# Patient Record
Sex: FEMALE | Race: WHITE | NOT HISPANIC OR LATINO | Employment: FULL TIME | URBAN - METROPOLITAN AREA
[De-identification: names, ages, dates, MRNs, and addresses within clinical notes are randomized per-mention and may not be internally consistent; named-entity substitution may affect disease eponyms.]

---

## 2017-04-11 ENCOUNTER — OFFICE VISIT (OUTPATIENT)
Dept: URGENT CARE | Facility: CLINIC | Age: 42
End: 2017-04-11
Payer: COMMERCIAL

## 2017-04-11 VITALS
HEIGHT: 64 IN | DIASTOLIC BLOOD PRESSURE: 66 MMHG | RESPIRATION RATE: 16 BRPM | TEMPERATURE: 98.1 F | SYSTOLIC BLOOD PRESSURE: 100 MMHG | HEART RATE: 81 BPM | OXYGEN SATURATION: 97 % | BODY MASS INDEX: 30.66 KG/M2 | WEIGHT: 179.6 LBS

## 2017-04-11 DIAGNOSIS — K14.8 TONGUE LESION: ICD-10-CM

## 2017-04-11 DIAGNOSIS — J06.9 VIRAL URI WITH COUGH: ICD-10-CM

## 2017-04-11 DIAGNOSIS — R49.0 HOARSE VOICE QUALITY: ICD-10-CM

## 2017-04-11 PROCEDURE — 99214 OFFICE O/P EST MOD 30 MIN: CPT | Performed by: NURSE PRACTITIONER

## 2017-04-11 RX ORDER — BENZONATATE 100 MG/1
100 CAPSULE ORAL 3 TIMES DAILY PRN
Qty: 15 CAP | Refills: 0 | Status: SHIPPED | OUTPATIENT
Start: 2017-04-11 | End: 2017-04-16

## 2017-04-11 ASSESSMENT — ENCOUNTER SYMPTOMS
SHORTNESS OF BREATH: 0
CHILLS: 0
SORE THROAT: 1
HEADACHES: 0
NECK PAIN: 0
WHEEZING: 0
PALPITATIONS: 0
FEVER: 0
MYALGIAS: 0
ORTHOPNEA: 0
DIAPHORESIS: 0
WEAKNESS: 0
HEMOPTYSIS: 0
SPUTUM PRODUCTION: 0
COUGH: 1

## 2017-04-11 NOTE — PROGRESS NOTES
"Subjective:      Jennifer Barton is a 41 y.o. female who presents with Laryngitis            HPI  Patient comes in today with 5-6 day history of a dry cough.  She notes that after she has coughing fits, she has a hoarse voice and her throat is mildly sore.  She has not taken any medication to treat the symptoms.  As a possible associated symptom, she also noted a new onset painful lesion on the right side of her tongue in the past 3 days.  She denies any known trauma to the tongue or excess salty or spicy foods.  She denies any prior history of similar symptoms.  No history of smoking or chewing tobacco.      Review of Systems   Constitutional: Positive for malaise/fatigue. Negative for fever, chills and diaphoresis.   HENT: Positive for sore throat. Negative for congestion and ear pain.    Respiratory: Positive for cough. Negative for hemoptysis, sputum production, shortness of breath and wheezing.    Cardiovascular: Negative for chest pain, palpitations, orthopnea and leg swelling.   Musculoskeletal: Negative for myalgias and neck pain.   Skin: Negative for rash.   Neurological: Negative for weakness and headaches.     Medications, Allergies, and current problem list reviewed today in Epic     Objective:     /66 mmHg  Pulse 81  Temp(Src) 36.7 °C (98.1 °F)  Resp 16  Ht 1.626 m (5' 4\")  Wt 81.466 kg (179 lb 9.6 oz)  BMI 30.81 kg/m2  SpO2 97%     Physical Exam   Constitutional: She is oriented to person, place, and time. She appears well-developed and well-nourished. No distress.   HENT:   Head: Normocephalic.   Right Ear: External ear normal.   Left Ear: External ear normal.   Nose: Nose normal.   Mouth/Throat: No oropharyngeal exudate.   Minimal oropharyngeal erythema with no exudate or edema.  Voice has a hoarse quality.  There is a white, plaque-like lesion on the right lateral side of the tongue near the back molar region. It has an irregular linear shape measuring roughly 3 mm wide and 6 mm long with " irregular, jagged borders. It is tender to palpation.  No erythema or swelling.  NO other oral lesions noted.   Eyes: Conjunctivae are normal. Pupils are equal, round, and reactive to light. Right eye exhibits no discharge. Left eye exhibits no discharge. No scleral icterus.   Neck: Neck supple. No JVD present. No tracheal deviation present. No thyromegaly present.   Cardiovascular: Normal rate, regular rhythm and normal heart sounds.  Exam reveals no gallop and no friction rub.    No murmur heard.  Pulmonary/Chest: Effort normal and breath sounds normal. No stridor. No respiratory distress. She has no wheezes. She has no rales. She exhibits no tenderness.   Dry cough in clinic.   Lymphadenopathy:     She has no cervical adenopathy.   Neurological: She is alert and oriented to person, place, and time.   Skin: Skin is warm and dry. No rash noted. She is not diaphoretic. No erythema.   Vitals reviewed.              Assessment/Plan:     1. Viral URI with cough  - benzonatate (TESSALON) 100 MG Cap; Take 1 Cap by mouth 3 times a day as needed for Cough for up to 5 days.  Dispense: 15 Cap; Refill: 0    2. Tongue lesion    3. Hoarse voice quality    Advised patient that based on the history and exam findings, this is likely a self-limiting viral illness.  There is no indication for antibiotics at this time.  Tessalon as prescribed prn cough.  OTC NSAIDs or tylenol prn fever, pain.  OTC throat sprays or lozenges prn symptom management.  Maintain adequate po hydration.  RTC in 5-7 days if symptoms persist, sooner if worse.  Monitor tongue lesion.  Follow up with PCP or dentist if lesion persists beyond 4 weeks, or sooner if increasing in pain, size, or other lesions develop.  Patient verbalized understanding of and agreed with plan of care.

## 2017-05-22 ENCOUNTER — OFFICE VISIT (OUTPATIENT)
Dept: URGENT CARE | Facility: PHYSICIAN GROUP | Age: 42
End: 2017-05-22
Payer: COMMERCIAL

## 2017-05-22 VITALS
RESPIRATION RATE: 16 BRPM | BODY MASS INDEX: 30.73 KG/M2 | OXYGEN SATURATION: 99 % | SYSTOLIC BLOOD PRESSURE: 106 MMHG | HEIGHT: 64 IN | HEART RATE: 72 BPM | DIASTOLIC BLOOD PRESSURE: 60 MMHG | WEIGHT: 180 LBS | TEMPERATURE: 99.1 F

## 2017-05-22 DIAGNOSIS — N39.0 ACUTE LOWER UTI: ICD-10-CM

## 2017-05-22 LAB
APPEARANCE UR: NORMAL
BILIRUB UR STRIP-MCNC: NEGATIVE MG/DL
COLOR UR AUTO: NORMAL
GLUCOSE UR STRIP.AUTO-MCNC: NEGATIVE MG/DL
INT CON NEG: NEGATIVE
INT CON POS: POSITIVE
KETONES UR STRIP.AUTO-MCNC: NEGATIVE MG/DL
LEUKOCYTE ESTERASE UR QL STRIP.AUTO: NORMAL
NITRITE UR QL STRIP.AUTO: POSITIVE
PH UR STRIP.AUTO: 7 [PH] (ref 5–8)
POC URINE PREGNANCY TEST: NEGATIVE
PROT UR QL STRIP: 100 MG/DL
RBC UR QL AUTO: NORMAL
SP GR UR STRIP.AUTO: 1.02
UROBILINOGEN UR STRIP-MCNC: NEGATIVE MG/DL

## 2017-05-22 PROCEDURE — 81025 URINE PREGNANCY TEST: CPT | Performed by: FAMILY MEDICINE

## 2017-05-22 PROCEDURE — 81002 URINALYSIS NONAUTO W/O SCOPE: CPT | Performed by: FAMILY MEDICINE

## 2017-05-22 PROCEDURE — 99214 OFFICE O/P EST MOD 30 MIN: CPT | Performed by: FAMILY MEDICINE

## 2017-05-22 RX ORDER — NITROFURANTOIN 25; 75 MG/1; MG/1
100 CAPSULE ORAL EVERY 12 HOURS
Qty: 10 CAP | Refills: 0 | Status: SHIPPED | OUTPATIENT
Start: 2017-05-22 | End: 2017-05-27

## 2017-05-22 ASSESSMENT — ENCOUNTER SYMPTOMS
FEVER: 0
CHILLS: 0
ABDOMINAL PAIN: 1
NAUSEA: 0
VOMITING: 0

## 2017-05-22 NOTE — MR AVS SNAPSHOT
"        Jennifer Barton   2017 2:15 PM   Office Visit   MRN: [id number]    Department:  [facility]   Dept Phone:  [phone]    Description:  Female : 1975   Provider:  Abundio Mercado M.D.           Reason for Visit     Urinary Frequency Painful urination x 4 days       Allergies as of 2017     No Known Allergies      You were diagnosed with     Acute lower UTI   [[id number]]         Vital Signs     Blood Pressure Pulse Temperature Respirations Height Weight    106/60 mmHg 72 37.3 °C (99.1 °F) 16 1.626 m (5' 4.02\") 81.647 kg (180 lb)    Body Mass Index Oxygen Saturation Smoking Status             30.88 kg/m2 99% Never Smoker          Basic Information     Date Of Birth Sex Race Ethnicity Preferred Language    1975 Female White Non- English      Health Maintenance        Date Due Completion Dates    PAP SMEAR 1998 ---    IMM DTaP/Tdap/Td Vaccine (2 - Td) 1/15/2025 1/15/2015            Results     POCT Urinalysis      Component Value Standard Range & Units    POC Color Straw Negative    POC Appearance Cloudy Negative    POC Leukocyte Esterase Large Negative    POC Nitrites Positive Negative    POC Urobiligen Negative Negative (0.2) mg/dL    POC Protein 100 Negative mg/dL    POC Urine PH 7.0 5.0 - 8.0    POC Blood Mod/Hemo Negative    POC Specific Gravity 1.020 <1.005 - >1.030    POC Ketones Negative Negative mg/dL    POC Biliruben Negative Negative mg/dL    POC Glucose Negative Negative mg/dL                POC Urine Pregnancy      Component Value Standard Range & Units    POC Urine Pregnancy Test Negative Negative    Internal Control Positive Positive     Internal Control Negative Negative                         Current Immunizations     Influenza Vaccine Quad Inj (Pf) 1/15/2015  4:08 AM    Tdap Vaccine 1/15/2015  4:10 AM      Below and/or attached are the medications your provider expects you to take. Review all of your home medications and newly ordered medications with your " provider and/or pharmacist. Follow medication instructions as directed by your provider and/or pharmacist. Please keep your medication list with you and share with your provider. Update the information when medications are discontinued, doses are changed, or new medications (including over-the-counter products) are added; and carry medication information at all times in the event of emergency situations     Allergies:  No Known Allergies          Medications  Valid as of: May 22, 2017 -  2:48 PM    Generic Name Brand Name Tablet Size Instructions for use    Amoxicillin-Pot Clavulanate (Tab) AUGMENTIN 875-125 MG Take 1 Tab by mouth 2 times a day.        Aspirin Effervescent   Take  by mouth.        Docusate Sodium (Cap) COLACE 100 MG Take 100 mg by mouth 2 times a day as needed.        Ibuprofen (Tab) MOTRIN 600 MG Take 1 Tab by mouth every 6 hours as needed (Cramping).        Ibuprofen (Tab) MOTRIN 600 MG Take 1 Tab by mouth every 6 hours as needed.        Nitrofurantoin Monohyd Macro (Cap) MACROBID 100 MG Take 1 Cap by mouth every 12 hours for 5 days.        Oxycodone-Acetaminophen (Tab) PERCOCET 5-325 MG Take 1-2 Tabs by mouth every four hours as needed (pain).        Prenatal Vit-Fe Fumarate-FA   Take 1 Tab by mouth.        .                 Medicines prescribed today were sent to:     [facility][id number] - [place] [address]    Phone: [phone] Fax: [phone]    Open 24 Hours?: No      Medication refill instructions:       If your prescription bottle indicates you have medication refills left, it is not necessary to call your provider’s office. Please contact your pharmacy and they will refill your medication.    If your prescription bottle indicates you do not have any refills left, you may request refills at any time through one of the following ways: The online [employer] system (except Urgent Care), by calling your provider’s office, or by asking your pharmacy to  contact your provider’s office with a refill request. Medication refills are processed only during regular business hours and may not be available until the next business day. Your provider may request additional information or to have a follow-up visit with you prior to refilling your medication.   *Please Note: Medication refills are assigned a new Rx number when refilled electronically. Your pharmacy may indicate that no refills were authorized even though a new prescription for the same medication is available at the pharmacy. Please request the medicine by name with the pharmacy before contacting your provider for a refill.        Instructions    Urinary Tract Infection  Urinary tract infections (UTIs) can develop anywhere along your urinary tract. Your urinary tract is your body's drainage system for removing wastes and extra water. Your urinary tract includes two kidneys, two ureters, a bladder, and a urethra. Your kidneys are a pair of bean-shaped organs. Each kidney is about the size of your fist. They are located below your ribs, one on each side of your spine.  CAUSES  Infections are caused by microbes, which are microscopic organisms, including fungi, viruses, and bacteria. These organisms are so small that they can only be seen through a microscope. Bacteria are the microbes that most commonly cause UTIs.  SYMPTOMS   Symptoms of UTIs may vary by age and gender of the patient and by the location of the infection. Symptoms in young women typically include a frequent and intense urge to urinate and a painful, burning feeling in the bladder or urethra during urination. Older women and men are more likely to be tired, shaky, and weak and have muscle aches and abdominal pain. A fever may mean the infection is in your kidneys. Other symptoms of a kidney infection include pain in your back or sides below the ribs, nausea, and vomiting.  DIAGNOSIS  To diagnose a UTI, your caregiver will ask you about your  symptoms. Your caregiver also will ask to provide a urine sample. The urine sample will be tested for bacteria and white blood cells. White blood cells are made by your body to help fight infection.  TREATMENT   Typically, UTIs can be treated with medication. Because most UTIs are caused by a bacterial infection, they usually can be treated with the use of antibiotics. The choice of antibiotic and length of treatment depend on your symptoms and the type of bacteria causing your infection.  HOME CARE INSTRUCTIONS  · If you were prescribed antibiotics, take them exactly as your caregiver instructs you. Finish the medication even if you feel better after you have only taken some of the medication.  · Drink enough water and fluids to keep your urine clear or pale yellow.  · Avoid caffeine, tea, and carbonated beverages. They tend to irritate your bladder.  · Empty your bladder often. Avoid holding urine for long periods of time.  · Empty your bladder before and after sexual intercourse.  · After a bowel movement, women should cleanse from front to back. Use each tissue only once.  SEEK MEDICAL CARE IF:   · You have back pain.  · You develop a fever.  · Your symptoms do not begin to resolve within 3 days.  SEEK IMMEDIATE MEDICAL CARE IF:   · You have severe back pain or lower abdominal pain.  · You develop chills.  · You have nausea or vomiting.  · You have continued burning or discomfort with urination.  MAKE SURE YOU:   · Understand these instructions.  · Will watch your condition.  · Will get help right away if you are not doing well or get worse.     This information is not intended to replace advice given to you by your health care provider. Make sure you discuss any questions you have with your health care provider.     Document Released: 09/27/2006 Document Revised: 01/08/2016 Document Reviewed: 01/25/2013  [employer] Interactive Patient Education ©2016 [employer]    Urinary Tract Infection  A urinary tract  infection (UTI) can occur any place along the urinary tract. The tract includes the kidneys, ureters, bladder, and urethra. A type of germ called bacteria often causes a UTI. UTIs are often helped with antibiotic medicine.   HOME CARE   · If given, take antibiotics as told by your doctor. Finish them even if you start to feel better.  · Drink enough fluids to keep your pee (urine) clear or pale yellow.  · Avoid tea, drinks with caffeine, and bubbly (carbonated) drinks.  · Pee often. Avoid holding your pee in for a long time.  · Pee before and after having sex (intercourse).  · Wipe from front to back after you poop (bowel movement) if you are a woman. Use each tissue only once.  GET HELP RIGHT AWAY IF:   · You have back pain.  · You have lower belly (abdominal) pain.  · You have chills.  · You feel sick to your stomach (nauseous).  · You throw up (vomit).  · Your burning or discomfort with peeing does not go away.  · You have a fever.  · Your symptoms are not better in 3 days.  MAKE SURE YOU:   · Understand these instructions.  · Will watch your condition.  · Will get help right away if you are not doing well or get worse.     This information is not intended to replace advice given to you by your health care provider. Make sure you discuss any questions you have with your health care provider.     Document Released: 06/05/2009 Document Revised: 01/08/2016 Document Reviewed: 07/18/2013  [email or web address] Interactive Patient Education ©2016 [employer] Inc.            Wellsphere Access Code: [email or web address]  Expires: 5/25/2017  1:05 PM    Wellsphere  A secure, online tool to manage your health information     [employer] Wellsphere® is a secure, online tool that connects you to your personalized health information from the privacy of your home -- day or night - making it very easy for you to manage your healthcare. Once the activation process is completed, you can even access your medical information using the Wellsphere rusty, which is  available for free in the Apple [employer] store or Google Play store.     [employer] Enterprise provides the following levels of access (as shown below):   My Chart Features   [facility] Doctor [facility]  Specialists [facility]  Urgent  Care Non-[facility]  Primary Care  Doctor   Email your healthcare team securely and privately 24/7 X X X    Manage appointments: schedule your next appointment; view details of past/upcoming appointments X      Request prescription refills. X      View recent personal medical records, including lab and immunizations X X X X   View health record, including health history, allergies, medications X X X X   Read reports about your outpatient visits, procedures, consult and ER notes X X X X   See your discharge summary, which is a recap of your hospital and/or ER visit that includes your diagnosis, lab results, and care plan. X X       How to register for [employer] Enterprise:  1. Go to  [email or web address].  2. Click on the Sign Up Now box, which takes you to the New Member Sign Up page. You will need to provide the following information:  a. Enter your Process System Enterprise Access Code exactly as it appears at the top of this page. (You will not need to use this code after you’ve completed the sign-up process. If you do not sign up before the expiration date, you must request a new code.)   b. Enter your date of birth.   c. Enter your home email address.   d. Click Submit, and follow the next screen’s instructions.  3. Create a Process System Enterprise ID. This will be your Process System Enterprise login ID and cannot be changed, so think of one that is secure and easy to remember.  4. Create a [employer] Enterprise password. You can change your password at any time.  5. Enter your Password Reset Question and Answer. This can be used at a later time if you forget your password.   6. Enter your e-mail address. This allows you to receive e-mail notifications when new information is available in Process System Enterprise.  7. Click Sign Up. You can now view your health information.    For  assistance activating your [employer] research[employer] account, call [phone]

## 2017-05-22 NOTE — PROGRESS NOTES
"Subjective:      Jennifer Barton is a 41 y.o. female who presents with Urinary Frequency            Urinary Frequency  This is a new problem. The current episode started in the past 7 days. The problem occurs constantly. The problem has been gradually worsening. Associated symptoms include abdominal pain and urinary symptoms. Pertinent negatives include no chills, fever, nausea or vomiting.       Review of Systems   Constitutional: Negative for fever and chills.   Gastrointestinal: Positive for abdominal pain. Negative for nausea and vomiting.     No Known Allergies      Objective:     /60 mmHg  Pulse 72  Temp(Src) 37.3 °C (99.1 °F)  Resp 16  Ht 1.626 m (5' 4.02\")  Wt 81.647 kg (180 lb)  BMI 30.88 kg/m2  SpO2 99%     Physical Exam   Constitutional: She is oriented to person, place, and time. She appears well-developed and well-nourished. No distress.   HENT:   Head: Normocephalic and atraumatic.   Eyes: Conjunctivae and EOM are normal. Pupils are equal, round, and reactive to light.   Cardiovascular: Normal rate, regular rhythm, normal heart sounds and intact distal pulses.    No murmur heard.  Pulmonary/Chest: Effort normal and breath sounds normal. No respiratory distress.   Abdominal: Soft. Bowel sounds are normal. She exhibits no distension. There is tenderness in the suprapubic area. There is no CVA tenderness.   Musculoskeletal: Normal range of motion.   Neurological: She is alert and oriented to person, place, and time. She has normal reflexes. No sensory deficit.   Skin: Skin is warm and dry.   Psychiatric: She has a normal mood and affect. Her behavior is normal.   Vitals reviewed.              Assessment/Plan:     1. Acute lower UTI  Differential diagnosis, natural history, supportive care, and indications for immediate follow-up discussed.   - POCT Urinalysis  - nitrofurantoin monohydr macro (MACROBID) 100 MG Cap; Take 1 Cap by mouth every 12 hours for 5 days.  Dispense: 10 Cap; Refill: 0  - POC " Urine Pregnancy

## 2017-05-22 NOTE — PATIENT INSTRUCTIONS
Urinary Tract Infection  Urinary tract infections (UTIs) can develop anywhere along your urinary tract. Your urinary tract is your body's drainage system for removing wastes and extra water. Your urinary tract includes two kidneys, two ureters, a bladder, and a urethra. Your kidneys are a pair of bean-shaped organs. Each kidney is about the size of your fist. They are located below your ribs, one on each side of your spine.  CAUSES  Infections are caused by microbes, which are microscopic organisms, including fungi, viruses, and bacteria. These organisms are so small that they can only be seen through a microscope. Bacteria are the microbes that most commonly cause UTIs.  SYMPTOMS   Symptoms of UTIs may vary by age and gender of the patient and by the location of the infection. Symptoms in young women typically include a frequent and intense urge to urinate and a painful, burning feeling in the bladder or urethra during urination. Older women and men are more likely to be tired, shaky, and weak and have muscle aches and abdominal pain. A fever may mean the infection is in your kidneys. Other symptoms of a kidney infection include pain in your back or sides below the ribs, nausea, and vomiting.  DIAGNOSIS  To diagnose a UTI, your caregiver will ask you about your symptoms. Your caregiver also will ask to provide a urine sample. The urine sample will be tested for bacteria and white blood cells. White blood cells are made by your body to help fight infection.  TREATMENT   Typically, UTIs can be treated with medication. Because most UTIs are caused by a bacterial infection, they usually can be treated with the use of antibiotics. The choice of antibiotic and length of treatment depend on your symptoms and the type of bacteria causing your infection.  HOME CARE INSTRUCTIONS  · If you were prescribed antibiotics, take them exactly as your caregiver instructs you. Finish the medication even if you feel better after you  have only taken some of the medication.  · Drink enough water and fluids to keep your urine clear or pale yellow.  · Avoid caffeine, tea, and carbonated beverages. They tend to irritate your bladder.  · Empty your bladder often. Avoid holding urine for long periods of time.  · Empty your bladder before and after sexual intercourse.  · After a bowel movement, women should cleanse from front to back. Use each tissue only once.  SEEK MEDICAL CARE IF:   · You have back pain.  · You develop a fever.  · Your symptoms do not begin to resolve within 3 days.  SEEK IMMEDIATE MEDICAL CARE IF:   · You have severe back pain or lower abdominal pain.  · You develop chills.  · You have nausea or vomiting.  · You have continued burning or discomfort with urination.  MAKE SURE YOU:   · Understand these instructions.  · Will watch your condition.  · Will get help right away if you are not doing well or get worse.     This information is not intended to replace advice given to you by your health care provider. Make sure you discuss any questions you have with your health care provider.     Document Released: 09/27/2006 Document Revised: 01/08/2016 Document Reviewed: 01/25/2013  [employer] Interactive Patient Education ©2016 Elsevier Inc.    Urinary Tract Infection  A urinary tract infection (UTI) can occur any place along the urinary tract. The tract includes the kidneys, ureters, bladder, and urethra. A type of germ called bacteria often causes a UTI. UTIs are often helped with antibiotic medicine.   HOME CARE   · If given, take antibiotics as told by your doctor. Finish them even if you start to feel better.  · Drink enough fluids to keep your pee (urine) clear or pale yellow.  · Avoid tea, drinks with caffeine, and bubbly (carbonated) drinks.  · Pee often. Avoid holding your pee in for a long time.  · Pee before and after having sex (intercourse).  · Wipe from front to back after you poop (bowel movement) if you are a woman. Use each  tissue only once.  GET HELP RIGHT AWAY IF:   · You have back pain.  · You have lower belly (abdominal) pain.  · You have chills.  · You feel sick to your stomach (nauseous).  · You throw up (vomit).  · Your burning or discomfort with peeing does not go away.  · You have a fever.  · Your symptoms are not better in 3 days.  MAKE SURE YOU:   · Understand these instructions.  · Will watch your condition.  · Will get help right away if you are not doing well or get worse.     This information is not intended to replace advice given to you by your health care provider. Make sure you discuss any questions you have with your health care provider.     Document Released: 06/05/2009 Document Revised: 01/08/2016 Document Reviewed: 07/18/2013  [employer] Interactive Patient Education ©2016 [employer]

## 2017-07-01 ENCOUNTER — HOSPITAL ENCOUNTER (OUTPATIENT)
Facility: MEDICAL CENTER | Age: 42
End: 2017-07-01
Attending: NURSE PRACTITIONER
Payer: COMMERCIAL

## 2017-07-01 ENCOUNTER — OFFICE VISIT (OUTPATIENT)
Dept: URGENT CARE | Facility: PHYSICIAN GROUP | Age: 42
End: 2017-07-01
Payer: COMMERCIAL

## 2017-07-01 VITALS
DIASTOLIC BLOOD PRESSURE: 70 MMHG | SYSTOLIC BLOOD PRESSURE: 100 MMHG | HEIGHT: 64 IN | OXYGEN SATURATION: 96 % | TEMPERATURE: 98.7 F | HEART RATE: 79 BPM | BODY MASS INDEX: 31.07 KG/M2 | WEIGHT: 182 LBS | RESPIRATION RATE: 16 BRPM

## 2017-07-01 DIAGNOSIS — N30.01 ACUTE CYSTITIS WITH HEMATURIA: ICD-10-CM

## 2017-07-01 DIAGNOSIS — R30.0 DYSURIA: ICD-10-CM

## 2017-07-01 LAB
APPEARANCE UR: NORMAL
BILIRUB UR STRIP-MCNC: NEGATIVE MG/DL
COLOR UR AUTO: NORMAL
GLUCOSE UR STRIP.AUTO-MCNC: NEGATIVE MG/DL
KETONES UR STRIP.AUTO-MCNC: NEGATIVE MG/DL
LEUKOCYTE ESTERASE UR QL STRIP.AUTO: NORMAL
NITRITE UR QL STRIP.AUTO: NEGATIVE
PH UR STRIP.AUTO: 6 [PH] (ref 5–8)
PROT UR QL STRIP: NORMAL MG/DL
RBC UR QL AUTO: NORMAL
SP GR UR STRIP.AUTO: 1.02
UROBILINOGEN UR STRIP-MCNC: NEGATIVE MG/DL

## 2017-07-01 PROCEDURE — 87077 CULTURE AEROBIC IDENTIFY: CPT

## 2017-07-01 PROCEDURE — 99213 OFFICE O/P EST LOW 20 MIN: CPT | Performed by: NURSE PRACTITIONER

## 2017-07-01 PROCEDURE — 87086 URINE CULTURE/COLONY COUNT: CPT

## 2017-07-01 PROCEDURE — 99000 SPECIMEN HANDLING OFFICE-LAB: CPT | Performed by: NURSE PRACTITIONER

## 2017-07-01 PROCEDURE — 81002 URINALYSIS NONAUTO W/O SCOPE: CPT | Performed by: NURSE PRACTITIONER

## 2017-07-01 PROCEDURE — 87186 SC STD MICRODIL/AGAR DIL: CPT

## 2017-07-01 RX ORDER — PHENAZOPYRIDINE HYDROCHLORIDE 200 MG/1
200 TABLET, FILM COATED ORAL 3 TIMES DAILY PRN
Qty: 6 TAB | Refills: 0 | Status: SHIPPED | OUTPATIENT
Start: 2017-07-01

## 2017-07-01 RX ORDER — NITROFURANTOIN 25; 75 MG/1; MG/1
100 CAPSULE ORAL 2 TIMES DAILY
Qty: 10 CAP | Refills: 0 | Status: SHIPPED | OUTPATIENT
Start: 2017-07-01 | End: 2017-07-06

## 2017-07-01 ASSESSMENT — ENCOUNTER SYMPTOMS
DIARRHEA: 0
BACK PAIN: 0
ABDOMINAL PAIN: 0
NAUSEA: 0
DIZZINESS: 0
VOMITING: 0
FLANK PAIN: 0
FEVER: 0

## 2017-07-01 ASSESSMENT — LIFESTYLE VARIABLES: SUBSTANCE_ABUSE: 0

## 2017-07-01 NOTE — PROGRESS NOTES
"Subjective:      Jennifer Barton is a 42 y.o. female who presents with Dysuria    Chief Complaint   Patient presents with   • Dysuria     pain & pressure when urinating, urgencies x 3 days     PFSH reviewed and updated as necessary in EPIC electronic record with patient today.  Medications including OTC medications reviewed with patient.         No Known Allergies          Dysuria   Associated symptoms include frequency and urgency. Pertinent negatives include no flank pain, hematuria, nausea or vomiting.   Recent UTI last month. Felt better after taking antibiotic. She has been trying to drink more fluids and cranberry juice but symptoms have persisted.     Review of Systems   Constitutional: Negative for fever.   Gastrointestinal: Negative for nausea, vomiting, abdominal pain and diarrhea.   Genitourinary: Positive for dysuria, urgency and frequency. Negative for hematuria and flank pain.   Musculoskeletal: Negative for back pain.   Neurological: Negative for dizziness.   Psychiatric/Behavioral: Negative for substance abuse.          Objective:     /70 mmHg  Pulse 79  Temp(Src) 37.1 °C (98.7 °F)  Resp 16  Ht 1.626 m (5' 4.02\")  Wt 82.555 kg (182 lb)  BMI 31.22 kg/m2  SpO2 96%     Physical Exam   Constitutional: She is oriented to person, place, and time. She appears well-developed and well-nourished.   HENT:   Head: Normocephalic.   Cardiovascular: Normal rate.    Pulmonary/Chest: Effort normal.   Neurological: She is alert and oriented to person, place, and time.   Skin: Skin is warm and dry.   Nursing note and vitals reviewed.         Pertinent previous visits, Labs & Imaging studies reviewed. (See  Epic chart)  No urine culture from last UTI.        Assessment/Plan:     1. Dysuria  POCT Urinalysis    URINE CULTURE(NEW)    nitrofurantoin monohydr macro (MACROBID) 100 MG Cap    phenazopyridine (PYRIDIUM) 200 MG Tab   2. Acute cystitis with hematuria       Educated in proper administration of " medication(s) ordered today including safety, possible SE, risks, benefits, rationale and alternatives to therapy.   Return to clinic or PCP 3-4 days if current symptoms are not resolving in a satisfactory manner or sooner if new or worsening symptoms occur.   Patient and or family advised differential diagnoses, signs and symptoms which would warrant Emergency Department evaluation.   Patient was in agreement with this treatment plan and seemed to understand without barriers. All questions were encouraged and answered  Pt education done. Aftercare instructions given to pt/ caregiver. Questions answered. Verbalizes good understanding.

## 2017-07-01 NOTE — MR AVS SNAPSHOT
"        Jennifer Barton   2017 11:30 AM   Office Visit   MRN: [id number]    Department:  [facility]   Dept Phone:  [phone]    Description:  Female : 1975   Provider:  CARLOS King           Reason for Visit     Dysuria pain & pressure when urinating, urgencies x 3 days      Allergies as of 2017     No Known Allergies      You were diagnosed with     Dysuria   [788.1.ICD-9-CM]       Acute cystitis with hematuria   [346608]         Vital Signs     Blood Pressure Pulse Temperature Respirations Height Weight    100/70 mmHg 79 37.1 °C (98.7 °F) 16 1.626 m (5' 4.02\") 82.555 kg (182 lb)    Body Mass Index Oxygen Saturation Smoking Status             31.22 kg/m2 96% Never Smoker          Basic Information     Date Of Birth Sex Race Ethnicity Preferred Language    1975 Female White Non- English      Health Maintenance        Date Due Completion Dates    PAP SMEAR 1998 ---    MAMMOGRAM 2017 ---    IMM INFLUENZA (1) 2017 1/15/2015    IMM DTaP/Tdap/Td Vaccine (2 - Td) 1/15/2025 1/15/2015            Results     POCT Urinalysis      Component Value Standard Range & Units    POC Color Straw Negative    POC Appearance Cloudy Negative    POC Leukocyte Esterase Moderate Negative    POC Nitrites Negative Negative    POC Urobiligen Negative Negative (0.2) mg/dL    POC Protein TRace Negative mg/dL    POC Urine PH 6.0 5.0 - 8.0    POC Blood TRace Negative    POC Specific Gravity  <1.005 - >1.030    POC Ketones Negative Negative mg/dL    POC Biliruben Negative Negative mg/dL    POC Glucose Negative Negative mg/dL                        Current Immunizations     Influenza Vaccine Quad Inj (Pf) 1/15/2015  4:08 AM    Tdap Vaccine 1/15/2015  4:10 AM      Below and/or attached are the medications your provider expects you to take. Review all of your home medications and newly ordered medications with your provider and/or pharmacist. Follow medication instructions as " directed by your provider and/or pharmacist. Please keep your medication list with you and share with your provider. Update the information when medications are discontinued, doses are changed, or new medications (including over-the-counter products) are added; and carry medication information at all times in the event of emergency situations     Allergies:  No Known Allergies          Medications  Valid as of: July 01, 2017 - 12:16 PM    Generic Name Brand Name Tablet Size Instructions for use    Amoxicillin-Pot Clavulanate (Tab) AUGMENTIN 875-125 MG Take 1 Tab by mouth 2 times a day.        Aspirin Effervescent   Take  by mouth.        Docusate Sodium (Cap) COLACE 100 MG Take 100 mg by mouth 2 times a day as needed.        Ibuprofen (Tab) MOTRIN 600 MG Take 1 Tab by mouth every 6 hours as needed (Cramping).        Ibuprofen (Tab) MOTRIN 600 MG Take 1 Tab by mouth every 6 hours as needed.        Nitrofurantoin Monohyd Macro (Cap) MACROBID 100 MG Take 1 Cap by mouth 2 times a day for 5 days.        Oxycodone-Acetaminophen (Tab) PERCOCET 5-325 MG Take 1-2 Tabs by mouth every four hours as needed (pain).        Phenazopyridine HCl (Tab) PYRIDIUM 200 MG Take 1 Tab by mouth 3 times a day as needed.        Prenatal Vit-Fe Fumarate-FA   Take 1 Tab by mouth.        .                 Medicines prescribed today were sent to:     [facility][id number] - [address] - [address] [place] [id number]    Phone: [phone] Fax: [phone]    Open 24 Hours?: No      Medication refill instructions:       If your prescription bottle indicates you have medication refills left, it is not necessary to call your provider’s office. Please contact your pharmacy and they will refill your medication.    If your prescription bottle indicates you do not have any refills left, you may request refills at any time through one of the following ways: The online [employer] system (except Urgent Care), by calling your provider’s office,  or by asking your pharmacy to contact your provider’s office with a refill request. Medication refills are processed only during regular business hours and may not be available until the next business day. Your provider may request additional information or to have a follow-up visit with you prior to refilling your medication.   *Please Note: Medication refills are assigned a new Rx number when refilled electronically. Your pharmacy may indicate that no refills were authorized even though a new prescription for the same medication is available at the pharmacy. Please request the medicine by name with the pharmacy before contacting your provider for a refill.        Your To Do List     Future Labs/Procedures Complete By Expires    URINE CULTURE(NEW)  As directed 7/1/2018         MergeLocal Access Code: [email or web address]  Expires: 7/22/2017  4:17 AM    MergeLocal  A secure, online tool to manage your health information     [employer]’s MergeLocal® is a secure, online tool that connects you to your personalized health information from the privacy of your home -- day or night - making it very easy for you to manage your healthcare. Once the activation process is completed, you can even access your medical information using the MergeLocal luis angel, which is available for free in the Apple Luis Angel store or Google Play store.     MergeLocal provides the following levels of access (as shown below):   My Chart Features   [facility]  Specialists [facility]  Urgent  Care [facility]  Primary Care  Doctor   Email your healthcare team securely and privately 24/7 X X X    Manage appointments: schedule your next appointment; view details of past/upcoming appointments X      Request prescription refills. X      View recent personal medical records, including lab and immunizations X X X X   View health record, including health history, allergies, medications X X X X   Read reports about your outpatient visits, procedures, consult and  ER notes X X X X   See your discharge summary, which is a recap of your hospital and/or ER visit that includes your diagnosis, lab results, and care plan. X X       How to register for [employer]:  1. Go to  [email or web address].  2. Click on the Sign Up Now box, which takes you to the New Member Sign Up page. You will need to provide the following information:  a. Enter your [employer] Access Code exactly as it appears at the top of this page. (You will not need to use this code after you’ve completed the sign-up process. If you do not sign up before the expiration date, you must request a new code.)   b. Enter your date of birth.   c. Enter your home email address.   d. Click Submit, and follow the next screen’s instructions.  3. Create a [employer] ID. This will be your [employer] login ID and cannot be changed, so think of one that is secure and easy to remember.  4. Create a [employer] password. You can change your password at any time.  5. Enter your Password Reset Question and Answer. This can be used at a later time if you forget your password.   6. Enter your e-mail address. This allows you to receive e-mail notifications when new information is available in [employer].  7. Click Sign Up. You can now view your health information.    For assistance activating your [employer] account, call [phone]

## 2017-07-02 DIAGNOSIS — R30.0 DYSURIA: ICD-10-CM

## 2017-07-04 LAB
BACTERIA UR CULT: ABNORMAL
SIGNIFICANT IND 70042: ABNORMAL
SOURCE SOURCE: ABNORMAL